# Patient Record
Sex: FEMALE | Race: WHITE | NOT HISPANIC OR LATINO | URBAN - METROPOLITAN AREA
[De-identification: names, ages, dates, MRNs, and addresses within clinical notes are randomized per-mention and may not be internally consistent; named-entity substitution may affect disease eponyms.]

---
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
## 2023-10-06 ENCOUNTER — RX RENEWAL (OUTPATIENT)
Age: 18
End: 2023-10-06

## 2023-11-21 ENCOUNTER — RX RENEWAL (OUTPATIENT)
Age: 18
End: 2023-11-21

## 2024-04-17 NOTE — ED PROVIDER NOTE - GASTROINTESTINAL [+], MLM
WHAT TO EXPECT AFTER YOUR COLONOSCOPY - DR. LEWIS          The nurses will watch you in the recovery area for 1 to 2 hours until the medicines wear off. Then you can go home. You will need a ride. You can resume a normal diet after the procedure. You are legally not allowed to drive or operate machinery after the procedure, as you will have received sedation. You should avoid alcohol until the next day. Do not plan on going back to work after your procedure.     If you are on any blood thinners, such as aspirin, Plavix, Coumadin, Xarelto, Eliquis, etc., be sure to ask your physician when you may resume these.  This will depend on the reason for the medication usage, and if any polyps were removed during the procedure.  All of your other medications you can resume normally.     Your doctor will talk to you about when you will need your next colonoscopy. This will depend on the results of the colonoscopy and your medical and family history.    Complications:    Colonoscopy is generally a very safe procedure with low complication risk. Common complaints after the procedure include bloating and excessive flatulence, and occasionally nausea. This is normal.     Other complications include:  Anesthesia/sedation issues  Bleeding, especially if polyps are removed (uncommon)  Most bleeding will stop on its own, but occasionally can require a another colonoscopy, blood products, or hospital admission  This risk is slightly higher in patients on blood thinners.   Perforation, or a hole in the colon, which can require hospital admission or emergency surgery (very rare)    Call the office [phone] or go to your nearest emergency room if you have fever greater than 101º, severe abdominal pain that does not get better after a few hours, or rectal bleeding that does not stop after a few hours. You may also call the office with any non-emergency questions or concerns.     Follow-up care is a key part of your treatment and 
ABDOMINAL PAIN/NAUSEA

## 2025-01-07 NOTE — PROGRESS NOTE PEDS - PROBLEM SELECTOR PROBLEM 1
Continue statin, fish oil, fenofibrate  
Type 1 diabetes mellitus
Diabetic ketoacidosis without coma associated with type 1 diabetes mellitus
Type 1 diabetes mellitus

## 2025-05-08 ENCOUNTER — APPOINTMENT (OUTPATIENT)
Dept: ENDOCRINOLOGY | Facility: CLINIC | Age: 20
End: 2025-05-08